# Patient Record
Sex: MALE | Race: ASIAN | NOT HISPANIC OR LATINO
[De-identification: names, ages, dates, MRNs, and addresses within clinical notes are randomized per-mention and may not be internally consistent; named-entity substitution may affect disease eponyms.]

---

## 2023-11-08 PROBLEM — Z00.00 ENCOUNTER FOR PREVENTIVE HEALTH EXAMINATION: Status: ACTIVE | Noted: 2023-11-08

## 2023-11-15 PROBLEM — R91.1 NODULE OF RIGHT LUNG: Status: ACTIVE | Noted: 2023-11-15

## 2023-11-16 ENCOUNTER — APPOINTMENT (OUTPATIENT)
Dept: THORACIC SURGERY | Facility: CLINIC | Age: 63
End: 2023-11-16
Payer: MEDICARE

## 2023-11-16 VITALS
TEMPERATURE: 97.9 F | BODY MASS INDEX: 23.92 KG/M2 | DIASTOLIC BLOOD PRESSURE: 68 MMHG | WEIGHT: 156 LBS | HEIGHT: 67.72 IN | OXYGEN SATURATION: 93 % | HEART RATE: 75 BPM | RESPIRATION RATE: 16 BRPM | SYSTOLIC BLOOD PRESSURE: 145 MMHG

## 2023-11-16 DIAGNOSIS — Z86.39 PERSONAL HISTORY OF OTHER ENDOCRINE, NUTRITIONAL AND METABOLIC DISEASE: ICD-10-CM

## 2023-11-16 DIAGNOSIS — R91.1 SOLITARY PULMONARY NODULE: ICD-10-CM

## 2023-11-16 DIAGNOSIS — Z56.0 UNEMPLOYMENT, UNSPECIFIED: ICD-10-CM

## 2023-11-16 DIAGNOSIS — Z87.891 PERSONAL HISTORY OF NICOTINE DEPENDENCE: ICD-10-CM

## 2023-11-16 DIAGNOSIS — Z78.9 OTHER SPECIFIED HEALTH STATUS: ICD-10-CM

## 2023-11-16 PROCEDURE — 99205 OFFICE O/P NEW HI 60 MIN: CPT

## 2023-11-16 SDOH — ECONOMIC STABILITY - INCOME SECURITY: UNEMPLOYMENT, UNSPECIFIED: Z56.0

## 2023-11-17 PROBLEM — Z78.9 ALCOHOL USE: Status: ACTIVE | Noted: 2023-11-17

## 2023-11-17 PROBLEM — Z56.0 UNEMPLOYED: Status: ACTIVE | Noted: 2023-11-17

## 2023-11-17 PROBLEM — Z86.39 HISTORY OF HYPERLIPIDEMIA: Status: RESOLVED | Noted: 2023-11-17 | Resolved: 2023-11-17

## 2023-11-17 PROBLEM — Z87.891 FORMER SMOKER: Status: ACTIVE | Noted: 2023-11-17

## 2023-11-17 PROBLEM — Z86.39 HISTORY OF TYPE 2 DIABETES MELLITUS: Status: RESOLVED | Noted: 2023-11-17 | Resolved: 2023-11-17

## 2023-11-17 RX ORDER — METFORMIN HYDROCHLORIDE 625 MG/1
TABLET ORAL
Refills: 0 | Status: ACTIVE | COMMUNITY

## 2023-11-17 RX ORDER — OMEPRAZOLE 40 MG/1
40 CAPSULE, DELAYED RELEASE ORAL
Refills: 0 | Status: ACTIVE | COMMUNITY

## 2023-11-17 RX ORDER — FOLIC ACID 1 MG/1
1 TABLET ORAL
Refills: 0 | Status: ACTIVE | COMMUNITY

## 2023-11-17 RX ORDER — CYANOCOBALAMIN (VITAMIN B-12) 100 MCG
100 TABLET ORAL
Refills: 0 | Status: ACTIVE | COMMUNITY

## 2023-11-17 RX ORDER — PRAVASTATIN SODIUM 20 MG/1
20 TABLET ORAL
Refills: 0 | Status: ACTIVE | COMMUNITY

## 2023-11-30 ENCOUNTER — OUTPATIENT (OUTPATIENT)
Dept: OUTPATIENT SERVICES | Facility: HOSPITAL | Age: 63
LOS: 1 days | End: 2023-11-30

## 2023-11-30 VITALS
WEIGHT: 149.91 LBS | DIASTOLIC BLOOD PRESSURE: 74 MMHG | OXYGEN SATURATION: 100 % | SYSTOLIC BLOOD PRESSURE: 115 MMHG | RESPIRATION RATE: 16 BRPM | HEIGHT: 67 IN | TEMPERATURE: 99 F | HEART RATE: 65 BPM

## 2023-11-30 DIAGNOSIS — R91.1 SOLITARY PULMONARY NODULE: ICD-10-CM

## 2023-11-30 DIAGNOSIS — E11.9 TYPE 2 DIABETES MELLITUS WITHOUT COMPLICATIONS: ICD-10-CM

## 2023-11-30 DIAGNOSIS — Z98.890 OTHER SPECIFIED POSTPROCEDURAL STATES: Chronic | ICD-10-CM

## 2023-11-30 LAB
BLD GP AB SCN SERPL QL: NEGATIVE — SIGNIFICANT CHANGE UP
BLD GP AB SCN SERPL QL: NEGATIVE — SIGNIFICANT CHANGE UP
RH IG SCN BLD-IMP: POSITIVE — SIGNIFICANT CHANGE UP

## 2023-11-30 RX ORDER — SODIUM CHLORIDE 9 MG/ML
1000 INJECTION, SOLUTION INTRAVENOUS
Refills: 0 | Status: DISCONTINUED | OUTPATIENT
Start: 2023-12-06 | End: 2023-12-07

## 2023-11-30 NOTE — H&P PST ADULT - OTHER CARE PROVIDERS
Dr Andersen [facility] [phone] Dr Andersen [facility] [phone] Dr Andersen [facility] [phone]

## 2023-11-30 NOTE — H&P PST ADULT - PRIMARY CARE PROVIDER
Dr Gus Scott cardiologist [phone] Dr Gus Scott cardiologist [phone] Dr Gus Scott cardiologist [phone]

## 2023-11-30 NOTE — H&P PST ADULT - ENDOCRINE COMMENTS
DM type 2 on metformin, A1C 6.6. h/o thyroid goiter as per PCP, normal TFT on recent labs, does not require medication

## 2023-11-30 NOTE — H&P PST ADULT - TEMPERATURE IN FAHRENHEIT (DEGREES F)
You have been given a medication that may cause drowsiness, do not drive or operate heavy machinery with this medication. Return to the Emergency department for any worsening or failure to improve, otherwise follow up with your primary care provider. Push Fluids.     98.6

## 2023-11-30 NOTE — H&P PST ADULT - HISTORY OF PRESENT ILLNESS
62 y/o male PMH HLD non obstructive CAD on recent cardiac cath presents to presurgical testing with diagnosis of solitary pulmonary nodule. Pt with right upper pulmonary nodule. Pt is scheduled for a right video assisted thoracoscopy robotic assisted wedge resection of right upper lobe possible right upper lobectomy mediastinal lymph node dissection.  64 y/o male PMH HLD non obstructive CAD on recent cardiac cath presents to presurgical testing with diagnosis of solitary pulmonary nodule. Pt with right upper pulmonary nodule. Pt is scheduled for a right video assisted thoracoscopy robotic assisted wedge resection of right upper lobe possible right upper lobectomy mediastinal lymph node dissection.

## 2023-11-30 NOTE — H&P PST ADULT - PROBLEM SELECTOR PLAN 1
Patient tentatively scheduled for right video assisted thoracoscopy robotic assisted wedge resection of right upper lobe possible right upper lobectomy mediastinal lymph node dissection for 12/6/23. Pre-op instructions provided via Mandarin . Pt given verbal and written instructions with teach back on chlorhexidine shampoo (mandarin hand out) and pepcid. Pt verbalized understanding with return demonstration.     Copy of medical and cardiac evaluation, cath report, ekg in chart, labs 11/29 CBC CMP A1C COAGS UA in chart.    T&S/ABO done

## 2023-11-30 NOTE — H&P PST ADULT - NSALCOHOLUSECOMMENT_GEN_ALL_CORE_FT
all other ROS negative except as per HPI
quit on 11/11/23 , used to drink daily, denies problems related to etoh use

## 2023-11-30 NOTE — H&P PST ADULT - NSICDXPASTMEDICALHX_GEN_ALL_CORE_FT
PAST MEDICAL HISTORY:  CAD (coronary artery disease)     DM2 (diabetes mellitus, type 2)     History of BPH     HLD (hyperlipidemia)     Joint pain     Solitary pulmonary nodule     Thyroid goiter     Tobacco use disorder

## 2023-12-05 NOTE — ASU PATIENT PROFILE, ADULT - FALL HARM RISK - UNIVERSAL INTERVENTIONS
Bed in lowest position, wheels locked, appropriate side rails in place/Call bell, personal items and telephone in reach/Instruct patient to call for assistance before getting out of bed or chair/Non-slip footwear when patient is out of bed/[place] to call system/Physically safe environment - no spills, clutter or unnecessary equipment/Purposeful Proactive Rounding/Room/bathroom lighting operational, light cord in reach Bed in lowest position, wheels locked, appropriate side rails in place/Call bell, personal items and telephone in reach/Instruct patient to call for assistance before getting out of bed or chair/Non-slip footwear when patient is out of bed/[place] to call system/Physically safe environment - no spills, clutter or unnecessary equipment/Purposeful Proactive Rounding/Room/bathroom lighting operational, light cord in reach

## 2023-12-06 ENCOUNTER — RESULT REVIEW (OUTPATIENT)
Age: 63
End: 2023-12-06

## 2023-12-06 ENCOUNTER — APPOINTMENT (OUTPATIENT)
Dept: THORACIC SURGERY | Facility: HOSPITAL | Age: 63
End: 2023-12-06

## 2023-12-06 ENCOUNTER — INPATIENT (INPATIENT)
Facility: HOSPITAL | Age: 63
LOS: 1 days | Discharge: HOME | End: 2023-12-08
Attending: THORACIC SURGERY (CARDIOTHORACIC VASCULAR SURGERY) | Admitting: THORACIC SURGERY (CARDIOTHORACIC VASCULAR SURGERY)
Payer: MEDICAID

## 2023-12-06 ENCOUNTER — TRANSCRIPTION ENCOUNTER (OUTPATIENT)
Age: 63
End: 2023-12-06

## 2023-12-06 VITALS
HEIGHT: 67 IN | TEMPERATURE: 97 F | SYSTOLIC BLOOD PRESSURE: 107 MMHG | DIASTOLIC BLOOD PRESSURE: 70 MMHG | OXYGEN SATURATION: 98 % | WEIGHT: 149.91 LBS | HEART RATE: 65 BPM | RESPIRATION RATE: 16 BRPM

## 2023-12-06 DIAGNOSIS — R91.1 SOLITARY PULMONARY NODULE: ICD-10-CM

## 2023-12-06 DIAGNOSIS — Z98.890 OTHER SPECIFIED POSTPROCEDURAL STATES: Chronic | ICD-10-CM

## 2023-12-06 LAB
GLUCOSE BLDC GLUCOMTR-MCNC: 115 MG/DL — HIGH (ref 70–99)
GLUCOSE BLDC GLUCOMTR-MCNC: 115 MG/DL — HIGH (ref 70–99)
GLUCOSE BLDC GLUCOMTR-MCNC: 119 MG/DL — HIGH (ref 70–99)
GLUCOSE BLDC GLUCOMTR-MCNC: 119 MG/DL — HIGH (ref 70–99)
GLUCOSE BLDC GLUCOMTR-MCNC: 129 MG/DL — HIGH (ref 70–99)
GLUCOSE BLDC GLUCOMTR-MCNC: 129 MG/DL — HIGH (ref 70–99)
GLUCOSE BLDC GLUCOMTR-MCNC: 147 MG/DL — HIGH (ref 70–99)
GLUCOSE BLDC GLUCOMTR-MCNC: 147 MG/DL — HIGH (ref 70–99)
GLUCOSE BLDC GLUCOMTR-MCNC: 165 MG/DL — HIGH (ref 70–99)
GLUCOSE BLDC GLUCOMTR-MCNC: 165 MG/DL — HIGH (ref 70–99)
GRAM STN FLD: SIGNIFICANT CHANGE UP
GRAM STN FLD: SIGNIFICANT CHANGE UP
SPECIMEN SOURCE: SIGNIFICANT CHANGE UP
SPECIMEN SOURCE: SIGNIFICANT CHANGE UP

## 2023-12-06 PROCEDURE — 71045 X-RAY EXAM CHEST 1 VIEW: CPT | Mod: 26

## 2023-12-06 PROCEDURE — 88312 SPECIAL STAINS GROUP 1: CPT | Mod: 26

## 2023-12-06 PROCEDURE — 32666 THORACOSCOPY W/WEDGE RESECT: CPT

## 2023-12-06 PROCEDURE — 32666 THORACOSCOPY W/WEDGE RESECT: CPT | Mod: 80

## 2023-12-06 PROCEDURE — 88313 SPECIAL STAINS GROUP 2: CPT | Mod: 26

## 2023-12-06 PROCEDURE — 88307 TISSUE EXAM BY PATHOLOGIST: CPT | Mod: 26

## 2023-12-06 PROCEDURE — S2900 ROBOTIC SURGICAL SYSTEM: CPT | Mod: NC

## 2023-12-06 DEVICE — STAPLER COVIDIEN TRI-STAPLE 60MM BLACK RELOAD: Type: IMPLANTABLE DEVICE | Site: RIGHT | Status: FUNCTIONAL

## 2023-12-06 DEVICE — CHEST DRAIN THORACIC ARGYLE PVC 28FR STRAIGHT: Type: IMPLANTABLE DEVICE | Site: RIGHT | Status: FUNCTIONAL

## 2023-12-06 RX ORDER — METFORMIN HYDROCHLORIDE 850 MG/1
1 TABLET ORAL
Refills: 0 | DISCHARGE

## 2023-12-06 RX ORDER — SODIUM CHLORIDE 9 MG/ML
1000 INJECTION, SOLUTION INTRAVENOUS
Refills: 0 | Status: DISCONTINUED | OUTPATIENT
Start: 2023-12-06 | End: 2023-12-08

## 2023-12-06 RX ORDER — ATORVASTATIN CALCIUM 80 MG/1
20 TABLET, FILM COATED ORAL AT BEDTIME
Refills: 0 | Status: DISCONTINUED | OUTPATIENT
Start: 2023-12-06 | End: 2023-12-08

## 2023-12-06 RX ORDER — ASPIRIN/CALCIUM CARB/MAGNESIUM 324 MG
81 TABLET ORAL DAILY
Refills: 0 | Status: DISCONTINUED | OUTPATIENT
Start: 2023-12-06 | End: 2023-12-08

## 2023-12-06 RX ORDER — HEPARIN SODIUM 5000 [USP'U]/ML
5000 INJECTION INTRAVENOUS; SUBCUTANEOUS EVERY 12 HOURS
Refills: 0 | Status: DISCONTINUED | OUTPATIENT
Start: 2023-12-06 | End: 2023-12-06

## 2023-12-06 RX ORDER — ASPIRIN/CALCIUM CARB/MAGNESIUM 324 MG
1 TABLET ORAL
Refills: 0 | DISCHARGE

## 2023-12-06 RX ORDER — DEXTROSE 50 % IN WATER 50 %
25 SYRINGE (ML) INTRAVENOUS ONCE
Refills: 0 | Status: DISCONTINUED | OUTPATIENT
Start: 2023-12-06 | End: 2023-12-08

## 2023-12-06 RX ORDER — FOLIC ACID 0.8 MG
1 TABLET ORAL DAILY
Refills: 0 | Status: DISCONTINUED | OUTPATIENT
Start: 2023-12-06 | End: 2023-12-08

## 2023-12-06 RX ORDER — PREGABALIN 225 MG/1
1000 CAPSULE ORAL DAILY
Refills: 0 | Status: DISCONTINUED | OUTPATIENT
Start: 2023-12-06 | End: 2023-12-08

## 2023-12-06 RX ORDER — POLYETHYLENE GLYCOL 3350 17 G/17G
17 POWDER, FOR SOLUTION ORAL DAILY
Refills: 0 | Status: DISCONTINUED | OUTPATIENT
Start: 2023-12-06 | End: 2023-12-08

## 2023-12-06 RX ORDER — DEXTROSE 50 % IN WATER 50 %
15 SYRINGE (ML) INTRAVENOUS ONCE
Refills: 0 | Status: DISCONTINUED | OUTPATIENT
Start: 2023-12-06 | End: 2023-12-08

## 2023-12-06 RX ORDER — INSULIN LISPRO 100/ML
VIAL (ML) SUBCUTANEOUS AT BEDTIME
Refills: 0 | Status: DISCONTINUED | OUTPATIENT
Start: 2023-12-06 | End: 2023-12-08

## 2023-12-06 RX ORDER — HYDROMORPHONE HYDROCHLORIDE 2 MG/ML
0.5 INJECTION INTRAMUSCULAR; INTRAVENOUS; SUBCUTANEOUS
Refills: 0 | Status: DISCONTINUED | OUTPATIENT
Start: 2023-12-06 | End: 2023-12-07

## 2023-12-06 RX ORDER — GABAPENTIN 400 MG/1
300 CAPSULE ORAL ONCE
Refills: 0 | Status: COMPLETED | OUTPATIENT
Start: 2023-12-06 | End: 2023-12-06

## 2023-12-06 RX ORDER — ACETAMINOPHEN 500 MG
975 TABLET ORAL ONCE
Refills: 0 | Status: COMPLETED | OUTPATIENT
Start: 2023-12-06 | End: 2023-12-06

## 2023-12-06 RX ORDER — INSULIN LISPRO 100/ML
VIAL (ML) SUBCUTANEOUS
Refills: 0 | Status: DISCONTINUED | OUTPATIENT
Start: 2023-12-06 | End: 2023-12-08

## 2023-12-06 RX ORDER — GLUCAGON INJECTION, SOLUTION 0.5 MG/.1ML
1 INJECTION, SOLUTION SUBCUTANEOUS ONCE
Refills: 0 | Status: DISCONTINUED | OUTPATIENT
Start: 2023-12-06 | End: 2023-12-08

## 2023-12-06 RX ORDER — HEPARIN SODIUM 5000 [USP'U]/ML
5000 INJECTION INTRAVENOUS; SUBCUTANEOUS EVERY 8 HOURS
Refills: 0 | Status: DISCONTINUED | OUTPATIENT
Start: 2023-12-06 | End: 2023-12-08

## 2023-12-06 RX ORDER — NALOXONE HYDROCHLORIDE 4 MG/.1ML
0.1 SPRAY NASAL
Refills: 0 | Status: DISCONTINUED | OUTPATIENT
Start: 2023-12-06 | End: 2023-12-08

## 2023-12-06 RX ORDER — SENNA PLUS 8.6 MG/1
2 TABLET ORAL AT BEDTIME
Refills: 0 | Status: DISCONTINUED | OUTPATIENT
Start: 2023-12-06 | End: 2023-12-08

## 2023-12-06 RX ORDER — DEXTROSE 50 % IN WATER 50 %
12.5 SYRINGE (ML) INTRAVENOUS ONCE
Refills: 0 | Status: DISCONTINUED | OUTPATIENT
Start: 2023-12-06 | End: 2023-12-08

## 2023-12-06 RX ORDER — DIPHENHYDRAMINE HCL 50 MG
50 CAPSULE ORAL EVERY 4 HOURS
Refills: 0 | Status: DISCONTINUED | OUTPATIENT
Start: 2023-12-06 | End: 2023-12-07

## 2023-12-06 RX ORDER — ONDANSETRON 8 MG/1
4 TABLET, FILM COATED ORAL EVERY 6 HOURS
Refills: 0 | Status: DISCONTINUED | OUTPATIENT
Start: 2023-12-06 | End: 2023-12-08

## 2023-12-06 RX ORDER — FOLIC ACID 0.8 MG
1 TABLET ORAL
Refills: 0 | DISCHARGE

## 2023-12-06 RX ORDER — HYDROMORPHONE HYDROCHLORIDE 2 MG/ML
30 INJECTION INTRAMUSCULAR; INTRAVENOUS; SUBCUTANEOUS
Refills: 0 | Status: DISCONTINUED | OUTPATIENT
Start: 2023-12-06 | End: 2023-12-07

## 2023-12-06 RX ADMIN — ATORVASTATIN CALCIUM 20 MILLIGRAM(S): 80 TABLET, FILM COATED ORAL at 22:32

## 2023-12-06 RX ADMIN — HYDROMORPHONE HYDROCHLORIDE 30 MILLILITER(S): 2 INJECTION INTRAMUSCULAR; INTRAVENOUS; SUBCUTANEOUS at 20:50

## 2023-12-06 RX ADMIN — HEPARIN SODIUM 5000 UNIT(S): 5000 INJECTION INTRAVENOUS; SUBCUTANEOUS at 09:38

## 2023-12-06 RX ADMIN — GABAPENTIN 300 MILLIGRAM(S): 400 CAPSULE ORAL at 09:38

## 2023-12-06 RX ADMIN — HYDROMORPHONE HYDROCHLORIDE 30 MILLILITER(S): 2 INJECTION INTRAMUSCULAR; INTRAVENOUS; SUBCUTANEOUS at 20:17

## 2023-12-06 RX ADMIN — SENNA PLUS 2 TABLET(S): 8.6 TABLET ORAL at 22:32

## 2023-12-06 RX ADMIN — HYDROMORPHONE HYDROCHLORIDE 30 MILLILITER(S): 2 INJECTION INTRAMUSCULAR; INTRAVENOUS; SUBCUTANEOUS at 13:12

## 2023-12-06 RX ADMIN — HEPARIN SODIUM 5000 UNIT(S): 5000 INJECTION INTRAVENOUS; SUBCUTANEOUS at 22:32

## 2023-12-06 RX ADMIN — Medication 975 MILLIGRAM(S): at 09:37

## 2023-12-06 NOTE — ASU PREOP CHECKLIST - 4.
partial upper and lower dentures, and lower dental implants, left upper tooth loose partial upper dentures, and lower dental implants, left upper tooth loose as per patient - anesthesia aware and assessed

## 2023-12-06 NOTE — ASU PREOP CHECKLIST - ANTIBIOTIC
took old antibiotic for ear inflammation/no (get order) took old antibiotic for ear inflammation yesterday, does not know the name of it/no (get order)

## 2023-12-06 NOTE — BRIEF OPERATIVE NOTE - NSICDXBRIEFPROCEDURE_GEN_ALL_CORE_FT
PROCEDURES:  Flexible bronchoscopy 06-Dec-2023 12:50:22  Afia Cavazos  Wedge resection, lung, robot-assisted, using VATS 06-Dec-2023 12:50:33  Afia Cavazos

## 2023-12-06 NOTE — ASU PREOP CHECKLIST - PATIENT'S PERSONAL PROPERTY REMOVED
upper and lower partial/dentures upper and lower partial, pacu 6/dentures upper partial, pacu 6/dentures

## 2023-12-06 NOTE — ASU PREOP CHECKLIST - NS PREOP CHK MONITOR ANESTHESIA CONSENT
regression or worsening at which point xrays will be obtained. I affirm this is a Patient Initiated Episode with a Patient who has not had a related appointment within my department in the past 7 days or scheduled within the next 24 hours.     Patient identification was verified at the start of the visit: Yes    Total Time: minutes: 5-10 minutes    Note: not billable if this call serves to triage the patient into an appointment for the relevant concern      JULES Pitt done

## 2023-12-06 NOTE — PATIENT PROFILE ADULT - FALL HARM RISK - UNIVERSAL INTERVENTIONS
Bed in lowest position, wheels locked, appropriate side rails in place/Call bell, personal items and telephone in reach/Instruct patient to call for assistance before getting out of bed or chair/Non-slip footwear when patient is out of bed/[place] to call system/Physically safe environment - no spills, clutter or unnecessary equipment/Purposeful Proactive Rounding/Room/bathroom lighting operational, light cord in reach Bed in lowest position, wheels locked, appropriate side rails in place/Call bell, personal items and telephone in reach/Instruct patient to call for assistance before getting out of bed or chair/Non-slip footwear when patient is out of bed/[place] to call system/Physically safe environment - no spills, clutter or unnecessary equipment/Purposeful Proactive Rounding/Room/bathroom lighting operational, light cord in reach

## 2023-12-06 NOTE — CHART NOTE - NSCHARTNOTEFT_GEN_A_CORE
Patient s/p Flex Bronch, Right Vats, robotic assisted RUL wedge resection.  Patient resting, IV PCA in place.  Chest tube to suction, no air leak noted.  Incision with dressing clean and dry.  Patient tolerating po, voiding.    Vital Signs Last 24 Hrs  T(C): 36.4 (06 Dec 2023 20:00), Max: 36.6 (06 Dec 2023 15:00)  T(F): 97.5 (06 Dec 2023 20:00), Max: 97.9 (06 Dec 2023 15:00)  HR: 82 (06 Dec 2023 20:00) (60 - 82)  BP: 114/75 (06 Dec 2023 20:00) (107/70 - 143/93)  BP(mean): 88 (06 Dec 2023 20:00) (86 - 108)  RR: 17 (06 Dec 2023 20:00) (12 - 19)  SpO2: 97% (06 Dec 2023 20:00) (95% - 100%)    Parameters below as of 06 Dec 2023 18:00  Patient On (Oxygen Delivery Method): nasal cannula  O2 Flow (L/min): 2    I&O's Detail    06 Dec 2023 07:01  -  06 Dec 2023 22:03  --------------------------------------------------------  IN:    Lactated Ringers: 210 mL    Oral Fluid: 630 mL  Total IN: 840 mL    OUT:    Chest Tube (mL): 75 mL    Voided (mL): 300 mL  Total OUT: 375 mL    Total NET: 465 mL      MEDICATIONS  (STANDING):  aspirin enteric coated 81 milliGRAM(s) Oral daily  atorvastatin 20 milliGRAM(s) Oral at bedtime  cyanocobalamin 1000 MICROGram(s) Oral daily  dextrose 5%. 1000 milliLiter(s) (50 mL/Hr) IV Continuous <Continuous>  dextrose 5%. 1000 milliLiter(s) (100 mL/Hr) IV Continuous <Continuous>  dextrose 50% Injectable 12.5 Gram(s) IV Push once  dextrose 50% Injectable 25 Gram(s) IV Push once  dextrose 50% Injectable 25 Gram(s) IV Push once  folic acid 1 milliGRAM(s) Oral daily  glucagon  Injectable 1 milliGRAM(s) IntraMuscular once  heparin   Injectable 5000 Unit(s) SubCutaneous every 8 hours  HYDROmorphone PCA (1 mG/mL) 30 milliLiter(s) PCA Continuous PCA Continuous  insulin lispro (ADMELOG) corrective regimen sliding scale   SubCutaneous at bedtime  insulin lispro (ADMELOG) corrective regimen sliding scale   SubCutaneous three times a day before meals  lactated ringers. 1000 milliLiter(s) (30 mL/Hr) IV Continuous <Continuous>  multivitamin 1 Tablet(s) Oral daily  polyethylene glycol 3350 17 Gram(s) Oral daily  senna 2 Tablet(s) Oral at bedtime    A/P: S/P Flex Bronch, Right Vats, robotic assisted  RUL wedge resection   Continue chest tube to suction   Follow up labs and CXR in am   Continue IV PCA for pain management   Chest PT, ambulation and incentive spirometer

## 2023-12-07 LAB
ANION GAP SERPL CALC-SCNC: 12 MMOL/L — SIGNIFICANT CHANGE UP (ref 7–14)
ANION GAP SERPL CALC-SCNC: 12 MMOL/L — SIGNIFICANT CHANGE UP (ref 7–14)
BASOPHILS # BLD AUTO: 0.03 K/UL — SIGNIFICANT CHANGE UP (ref 0–0.2)
BASOPHILS # BLD AUTO: 0.03 K/UL — SIGNIFICANT CHANGE UP (ref 0–0.2)
BASOPHILS NFR BLD AUTO: 0.6 % — SIGNIFICANT CHANGE UP (ref 0–2)
BASOPHILS NFR BLD AUTO: 0.6 % — SIGNIFICANT CHANGE UP (ref 0–2)
BUN SERPL-MCNC: 15 MG/DL — SIGNIFICANT CHANGE UP (ref 7–23)
BUN SERPL-MCNC: 15 MG/DL — SIGNIFICANT CHANGE UP (ref 7–23)
CALCIUM SERPL-MCNC: 8.8 MG/DL — SIGNIFICANT CHANGE UP (ref 8.4–10.5)
CALCIUM SERPL-MCNC: 8.8 MG/DL — SIGNIFICANT CHANGE UP (ref 8.4–10.5)
CHLORIDE SERPL-SCNC: 101 MMOL/L — SIGNIFICANT CHANGE UP (ref 98–107)
CHLORIDE SERPL-SCNC: 101 MMOL/L — SIGNIFICANT CHANGE UP (ref 98–107)
CO2 SERPL-SCNC: 25 MMOL/L — SIGNIFICANT CHANGE UP (ref 22–31)
CO2 SERPL-SCNC: 25 MMOL/L — SIGNIFICANT CHANGE UP (ref 22–31)
CREAT SERPL-MCNC: 0.79 MG/DL — SIGNIFICANT CHANGE UP (ref 0.5–1.3)
CREAT SERPL-MCNC: 0.79 MG/DL — SIGNIFICANT CHANGE UP (ref 0.5–1.3)
EGFR: 99 ML/MIN/1.73M2 — SIGNIFICANT CHANGE UP
EGFR: 99 ML/MIN/1.73M2 — SIGNIFICANT CHANGE UP
EOSINOPHIL # BLD AUTO: 0.01 K/UL — SIGNIFICANT CHANGE UP (ref 0–0.5)
EOSINOPHIL # BLD AUTO: 0.01 K/UL — SIGNIFICANT CHANGE UP (ref 0–0.5)
EOSINOPHIL NFR BLD AUTO: 0.2 % — SIGNIFICANT CHANGE UP (ref 0–6)
EOSINOPHIL NFR BLD AUTO: 0.2 % — SIGNIFICANT CHANGE UP (ref 0–6)
GLUCOSE BLDC GLUCOMTR-MCNC: 131 MG/DL — HIGH (ref 70–99)
GLUCOSE BLDC GLUCOMTR-MCNC: 131 MG/DL — HIGH (ref 70–99)
GLUCOSE BLDC GLUCOMTR-MCNC: 136 MG/DL — HIGH (ref 70–99)
GLUCOSE BLDC GLUCOMTR-MCNC: 136 MG/DL — HIGH (ref 70–99)
GLUCOSE BLDC GLUCOMTR-MCNC: 159 MG/DL — HIGH (ref 70–99)
GLUCOSE BLDC GLUCOMTR-MCNC: 159 MG/DL — HIGH (ref 70–99)
GLUCOSE BLDC GLUCOMTR-MCNC: 171 MG/DL — HIGH (ref 70–99)
GLUCOSE BLDC GLUCOMTR-MCNC: 171 MG/DL — HIGH (ref 70–99)
GLUCOSE SERPL-MCNC: 135 MG/DL — HIGH (ref 70–99)
GLUCOSE SERPL-MCNC: 135 MG/DL — HIGH (ref 70–99)
HCT VFR BLD CALC: 38.2 % — LOW (ref 39–50)
HCT VFR BLD CALC: 38.2 % — LOW (ref 39–50)
HGB BLD-MCNC: 12.8 G/DL — LOW (ref 13–17)
HGB BLD-MCNC: 12.8 G/DL — LOW (ref 13–17)
IANC: 3.68 K/UL — SIGNIFICANT CHANGE UP (ref 1.8–7.4)
IANC: 3.68 K/UL — SIGNIFICANT CHANGE UP (ref 1.8–7.4)
IMM GRANULOCYTES NFR BLD AUTO: 0.2 % — SIGNIFICANT CHANGE UP (ref 0–0.9)
IMM GRANULOCYTES NFR BLD AUTO: 0.2 % — SIGNIFICANT CHANGE UP (ref 0–0.9)
LYMPHOCYTES # BLD AUTO: 0.85 K/UL — LOW (ref 1–3.3)
LYMPHOCYTES # BLD AUTO: 0.85 K/UL — LOW (ref 1–3.3)
LYMPHOCYTES # BLD AUTO: 16.8 % — SIGNIFICANT CHANGE UP (ref 13–44)
LYMPHOCYTES # BLD AUTO: 16.8 % — SIGNIFICANT CHANGE UP (ref 13–44)
MCHC RBC-ENTMCNC: 32.5 PG — SIGNIFICANT CHANGE UP (ref 27–34)
MCHC RBC-ENTMCNC: 32.5 PG — SIGNIFICANT CHANGE UP (ref 27–34)
MCHC RBC-ENTMCNC: 33.5 GM/DL — SIGNIFICANT CHANGE UP (ref 32–36)
MCHC RBC-ENTMCNC: 33.5 GM/DL — SIGNIFICANT CHANGE UP (ref 32–36)
MCV RBC AUTO: 97 FL — SIGNIFICANT CHANGE UP (ref 80–100)
MCV RBC AUTO: 97 FL — SIGNIFICANT CHANGE UP (ref 80–100)
MONOCYTES # BLD AUTO: 0.48 K/UL — SIGNIFICANT CHANGE UP (ref 0–0.9)
MONOCYTES # BLD AUTO: 0.48 K/UL — SIGNIFICANT CHANGE UP (ref 0–0.9)
MONOCYTES NFR BLD AUTO: 9.5 % — SIGNIFICANT CHANGE UP (ref 2–14)
MONOCYTES NFR BLD AUTO: 9.5 % — SIGNIFICANT CHANGE UP (ref 2–14)
NEUTROPHILS # BLD AUTO: 3.68 K/UL — SIGNIFICANT CHANGE UP (ref 1.8–7.4)
NEUTROPHILS # BLD AUTO: 3.68 K/UL — SIGNIFICANT CHANGE UP (ref 1.8–7.4)
NEUTROPHILS NFR BLD AUTO: 72.7 % — SIGNIFICANT CHANGE UP (ref 43–77)
NEUTROPHILS NFR BLD AUTO: 72.7 % — SIGNIFICANT CHANGE UP (ref 43–77)
NIGHT BLUE STAIN TISS: SIGNIFICANT CHANGE UP
NIGHT BLUE STAIN TISS: SIGNIFICANT CHANGE UP
NRBC # BLD: 0 /100 WBCS — SIGNIFICANT CHANGE UP (ref 0–0)
NRBC # BLD: 0 /100 WBCS — SIGNIFICANT CHANGE UP (ref 0–0)
NRBC # FLD: 0 K/UL — SIGNIFICANT CHANGE UP (ref 0–0)
NRBC # FLD: 0 K/UL — SIGNIFICANT CHANGE UP (ref 0–0)
PLATELET # BLD AUTO: 169 K/UL — SIGNIFICANT CHANGE UP (ref 150–400)
PLATELET # BLD AUTO: 169 K/UL — SIGNIFICANT CHANGE UP (ref 150–400)
POTASSIUM SERPL-MCNC: 3.9 MMOL/L — SIGNIFICANT CHANGE UP (ref 3.5–5.3)
POTASSIUM SERPL-MCNC: 3.9 MMOL/L — SIGNIFICANT CHANGE UP (ref 3.5–5.3)
POTASSIUM SERPL-SCNC: 3.9 MMOL/L — SIGNIFICANT CHANGE UP (ref 3.5–5.3)
POTASSIUM SERPL-SCNC: 3.9 MMOL/L — SIGNIFICANT CHANGE UP (ref 3.5–5.3)
RBC # BLD: 3.94 M/UL — LOW (ref 4.2–5.8)
RBC # BLD: 3.94 M/UL — LOW (ref 4.2–5.8)
RBC # FLD: 12.3 % — SIGNIFICANT CHANGE UP (ref 10.3–14.5)
RBC # FLD: 12.3 % — SIGNIFICANT CHANGE UP (ref 10.3–14.5)
SODIUM SERPL-SCNC: 138 MMOL/L — SIGNIFICANT CHANGE UP (ref 135–145)
SODIUM SERPL-SCNC: 138 MMOL/L — SIGNIFICANT CHANGE UP (ref 135–145)
SPECIMEN SOURCE: SIGNIFICANT CHANGE UP
SPECIMEN SOURCE: SIGNIFICANT CHANGE UP
WBC # BLD: 5.06 K/UL — SIGNIFICANT CHANGE UP (ref 3.8–10.5)
WBC # BLD: 5.06 K/UL — SIGNIFICANT CHANGE UP (ref 3.8–10.5)
WBC # FLD AUTO: 5.06 K/UL — SIGNIFICANT CHANGE UP (ref 3.8–10.5)
WBC # FLD AUTO: 5.06 K/UL — SIGNIFICANT CHANGE UP (ref 3.8–10.5)

## 2023-12-07 PROCEDURE — 71045 X-RAY EXAM CHEST 1 VIEW: CPT | Mod: 26,76

## 2023-12-07 RX ORDER — POLYETHYLENE GLYCOL 3350 17 G/17G
17 POWDER, FOR SOLUTION ORAL
Qty: 0 | Refills: 0 | DISCHARGE
Start: 2023-12-07

## 2023-12-07 RX ORDER — OXYCODONE HYDROCHLORIDE 5 MG/1
5 TABLET ORAL
Refills: 0 | Status: DISCONTINUED | OUTPATIENT
Start: 2023-12-07 | End: 2023-12-08

## 2023-12-07 RX ORDER — HYDROMORPHONE HYDROCHLORIDE 2 MG/ML
0.2 INJECTION INTRAMUSCULAR; INTRAVENOUS; SUBCUTANEOUS
Refills: 0 | Status: DISCONTINUED | OUTPATIENT
Start: 2023-12-07 | End: 2023-12-08

## 2023-12-07 RX ORDER — SENNA PLUS 8.6 MG/1
2 TABLET ORAL
Qty: 0 | Refills: 0 | DISCHARGE
Start: 2023-12-07

## 2023-12-07 RX ORDER — OXYCODONE HYDROCHLORIDE 5 MG/1
2.5 TABLET ORAL
Refills: 0 | Status: DISCONTINUED | OUTPATIENT
Start: 2023-12-07 | End: 2023-12-08

## 2023-12-07 RX ORDER — ACETAMINOPHEN 500 MG
650 TABLET ORAL EVERY 6 HOURS
Refills: 0 | Status: DISCONTINUED | OUTPATIENT
Start: 2023-12-07 | End: 2023-12-08

## 2023-12-07 RX ADMIN — Medication 650 MILLIGRAM(S): at 12:34

## 2023-12-07 RX ADMIN — PREGABALIN 1000 MICROGRAM(S): 225 CAPSULE ORAL at 11:33

## 2023-12-07 RX ADMIN — SENNA PLUS 2 TABLET(S): 8.6 TABLET ORAL at 22:55

## 2023-12-07 RX ADMIN — Medication 1 TABLET(S): at 11:34

## 2023-12-07 RX ADMIN — Medication 1 MILLIGRAM(S): at 11:33

## 2023-12-07 RX ADMIN — Medication 1: at 12:14

## 2023-12-07 RX ADMIN — HYDROMORPHONE HYDROCHLORIDE 30 MILLILITER(S): 2 INJECTION INTRAMUSCULAR; INTRAVENOUS; SUBCUTANEOUS at 07:21

## 2023-12-07 RX ADMIN — Medication 650 MILLIGRAM(S): at 11:34

## 2023-12-07 RX ADMIN — ATORVASTATIN CALCIUM 20 MILLIGRAM(S): 80 TABLET, FILM COATED ORAL at 22:55

## 2023-12-07 RX ADMIN — Medication 81 MILLIGRAM(S): at 11:35

## 2023-12-07 RX ADMIN — HEPARIN SODIUM 5000 UNIT(S): 5000 INJECTION INTRAVENOUS; SUBCUTANEOUS at 22:55

## 2023-12-07 RX ADMIN — Medication 1: at 17:47

## 2023-12-07 RX ADMIN — Medication 650 MILLIGRAM(S): at 17:19

## 2023-12-07 RX ADMIN — HEPARIN SODIUM 5000 UNIT(S): 5000 INJECTION INTRAVENOUS; SUBCUTANEOUS at 06:17

## 2023-12-07 RX ADMIN — POLYETHYLENE GLYCOL 3350 17 GRAM(S): 17 POWDER, FOR SOLUTION ORAL at 11:35

## 2023-12-07 RX ADMIN — HEPARIN SODIUM 5000 UNIT(S): 5000 INJECTION INTRAVENOUS; SUBCUTANEOUS at 14:00

## 2023-12-07 RX ADMIN — Medication 650 MILLIGRAM(S): at 22:55

## 2023-12-07 NOTE — PROGRESS NOTE ADULT - SUBJECTIVE AND OBJECTIVE BOX
ANESTHESIA POSTOP CHECK    63y Male POSTOP DAY 1 S/P   [ x] General Anesthesia  [ ] Carlos Anesthesia  [ ] MAC    Vital Signs Last 24 Hrs  T(C): 36.7 (07 Dec 2023 04:35), Max: 37.1 (07 Dec 2023 00:10)  T(F): 98.1 (07 Dec 2023 04:35), Max: 98.7 (07 Dec 2023 00:10)  HR: 75 (07 Dec 2023 04:35) (60 - 82)  BP: 110/64 (07 Dec 2023 04:35) (104/60 - 143/93)  BP(mean): 88 (06 Dec 2023 20:00) (86 - 108)  RR: 18 (07 Dec 2023 04:35) (12 - 19)  SpO2: 99% (07 Dec 2023 04:35) (95% - 100%)    Parameters below as of 07 Dec 2023 04:35  Patient On (Oxygen Delivery Method): room air      I&O's Summary    06 Dec 2023 07:01  -  07 Dec 2023 07:00  --------------------------------------------------------  IN: 1170 mL / OUT: 720 mL / NET: 450 mL        [x ] NO APPARENT ANESTHESIA COMPLICATIONS      Comments:

## 2023-12-07 NOTE — DISCHARGE NOTE PROVIDER - CARE PROVIDERS DIRECT ADDRESSES
[email or web address]

## 2023-12-07 NOTE — PROGRESS NOTE ADULT - SUBJECTIVE AND OBJECTIVE BOX
Subjective: patient seen and examined with thoracic surgery team and DR Morillo  pt without acute complaints  pain controlled  pt denies chest pain or shortness of breath  using incentive spirometer  on bowel regimen, +flatus, +BM  ambulatory with assistance  tube removed today  fever Tmax 102.7  d/w attending on morning TEAMS report      Vital Signs:  Vital Signs Last 24 Hrs  T(C): 37.9 (12-07-23 @ 14:19), Max: 39.2 (12-07-23 @ 12:27)  T(F): 100.2 (12-07-23 @ 14:19), Max: 102.6 (12-07-23 @ 12:27)  HR: 89 (12-07-23 @ 12:27) (62 - 89)  BP: 110/62 (12-07-23 @ 12:27) (103/67 - 123/74)  RR: 18 (12-07-23 @ 12:27) (13 - 19)  SpO2: 96% (12-07-23 @ 12:27) (95% - 100%) on (O2)    PE  Telemetry/Alarms: SR  General: awake and alert NAD  Neurology: A&Ox3, nonfocal, WILKERSON x 4  Respiratory: CTA B/L  CV: RRR, S1S2, no murmurs, rubs or gallops  Abdominal: Soft, NT, ND +BS, Last BM  Extremities: No edema, + peripheral pulses  Incisions: c,d,i  Tubes: tube removed; f/u CXR reviewed  Relevant labs, radiology and Medications reviewed                        12.8   5.06  )-----------( 169      ( 07 Dec 2023 05:45 )             38.2     12-07    138  |  101  |  15  ----------------------------<  135<H>  3.9   |  25  |  0.79    Ca    8.8      07 Dec 2023 05:45        MEDICATIONS  (STANDING):  acetaminophen     Tablet .. 650 milliGRAM(s) Oral every 6 hours  aspirin enteric coated 81 milliGRAM(s) Oral daily  atorvastatin 20 milliGRAM(s) Oral at bedtime  cyanocobalamin 1000 MICROGram(s) Oral daily  dextrose 5%. 1000 milliLiter(s) (50 mL/Hr) IV Continuous <Continuous>  dextrose 5%. 1000 milliLiter(s) (100 mL/Hr) IV Continuous <Continuous>  dextrose 50% Injectable 25 Gram(s) IV Push once  dextrose 50% Injectable 12.5 Gram(s) IV Push once  dextrose 50% Injectable 25 Gram(s) IV Push once  folic acid 1 milliGRAM(s) Oral daily  glucagon  Injectable 1 milliGRAM(s) IntraMuscular once  heparin   Injectable 5000 Unit(s) SubCutaneous every 8 hours  insulin lispro (ADMELOG) corrective regimen sliding scale   SubCutaneous at bedtime  insulin lispro (ADMELOG) corrective regimen sliding scale   SubCutaneous three times a day before meals  multivitamin 1 Tablet(s) Oral daily  polyethylene glycol 3350 17 Gram(s) Oral daily  senna 2 Tablet(s) Oral at bedtime    MEDICATIONS  (PRN):  dextrose Oral Gel 15 Gram(s) Oral once PRN Blood Glucose LESS THAN 70 milliGRAM(s)/deciliter  HYDROmorphone  Injectable 0.2 milliGRAM(s) IV Push every 3 hours PRN Severe breakthrough  Pain (7 - 10)  naloxone Injectable 0.1 milliGRAM(s) IV Push every 3 minutes PRN For ANY of the following changes in patient status:  A. RR LESS THAN 10 breaths per minute, B. Oxygen saturation LESS THAN 90%, C. Sedation score of 6  ondansetron Injectable 4 milliGRAM(s) IV Push every 6 hours PRN Nausea  oxyCODONE    IR 2.5 milliGRAM(s) Oral every 3 hours PRN Moderate Pain (4 - 6)  oxyCODONE    IR 5 milliGRAM(s) Oral every 3 hours PRN Severe Pain (7 - 10)    Pertinent Physical Exam  I&O's Summary    06 Dec 2023 07:01  -  07 Dec 2023 07:00  --------------------------------------------------------  IN: 1170 mL / OUT: 720 mL / NET: 450 mL        PAST MEDICAL & SURGICAL HISTORY:  HLD (hyperlipidemia)      CAD (coronary artery disease)      DM2 (diabetes mellitus, type 2)      History of BPH      Thyroid goiter      Solitary pulmonary nodule      Tobacco use disorder      Joint pain      History of cardiac cath      ASSESSMENT  63y old  Male who presents with a chief complaint of solitary pulmonary nodule  (07 Dec 2023 00:17)  S/P RVATS RUL wedge resection 12/6/2023. Postop, tube removed. Pt febrile to 102.7, WBC 5  d/w Dr Morillo will keep in hospital for observation    PLAN  Neuro: Pain management  Pulm: Encourage coughing, deep breathing and use of incentive spirometry. Wean off supplemental oxygen as able. Daily CXR.   Cardio: Monitor telemetry/alarms  GI: Tolerating diet. Continue stool softeners.  Renal: monitor urine output, supplement electrolytes as needed  Vasc: Heparin SC/SCDs for DVT prophylaxis  Heme: Stable H/H. .   ID: Off antibiotics. Stable.  Therapy: OOB/ambulate  Disposition: Pt febrile to 102.7, WBC 5  d/w Dr Morillo will keep in hospital for observation  Discussed with Cardiothoracic Team at AM rounds.

## 2023-12-07 NOTE — DISCHARGE NOTE PROVIDER - NSDCMRMEDTOKEN_GEN_ALL_CORE_FT
aspirin 81 mg oral delayed release tablet: 1 tab(s) orally once a day (in the morning)  folic acid 1 mg oral tablet: 1 tab(s) orally once a day (in the morning)  metFORMIN 850 mg oral tablet: 1 tab(s) orally 2 times a day  Multiple Vitamins oral capsule: 1 cap(s) orally once a day (in the morning)  pravastatin 20 mg oral tablet: 1 tab(s) orally once a day (in the morning)  vit b12 1000 mcg every morning:    aspirin 81 mg oral delayed release tablet: 1 tab(s) orally once a day (in the morning)  folic acid 1 mg oral tablet: 1 tab(s) orally once a day (in the morning)  metFORMIN 850 mg oral tablet: 1 tab(s) orally 2 times a day  Multiple Vitamins oral capsule: 1 cap(s) orally once a day (in the morning)  polyethylene glycol 3350 oral powder for reconstitution: 17 gram(s) orally once a day  pravastatin 20 mg oral tablet: 1 tab(s) orally once a day (in the morning)  senna leaf extract oral tablet: 2 tab(s) orally once a day (at bedtime)  vit b12 1000 mcg every morning:    acetaminophen 325 mg oral tablet: 2 tab(s) orally every 6 hours  aspirin 81 mg oral delayed release tablet: 1 tab(s) orally once a day (in the morning)  folic acid 1 mg oral tablet: 1 tab(s) orally once a day (in the morning)  metFORMIN 850 mg oral tablet: 1 tab(s) orally 2 times a day  Multiple Vitamins oral capsule: 1 cap(s) orally once a day (in the morning)  oxyCODONE 5 mg oral tablet: 1 tab(s) orally every 4 hours as needed for Severe Pain (7 - 10) MDD: 6  polyethylene glycol 3350 oral powder for reconstitution: 17 gram(s) orally once a day  pravastatin 20 mg oral tablet: 1 tab(s) orally once a day (in the morning)  senna leaf extract oral tablet: 2 tab(s) orally once a day (at bedtime)  vit b12 1000 mcg every morning:

## 2023-12-07 NOTE — DISCHARGE NOTE PROVIDER - NSDCCPTREATMENT_GEN_ALL_CORE_FT
PRINCIPAL PROCEDURE  Procedure: Wedge resection, lung, robot-assisted, using VATS  Findings and Treatment:

## 2023-12-07 NOTE — PROGRESS NOTE ADULT - SUBJECTIVE AND OBJECTIVE BOX
POST ANESTHESIA EVALUATION    63y Male POSTOP DAY 1   MENTAL STATUS: Patient participation [x  ] Awake     [  ] Arousable     [  ] Sedated    AIRWAY PATENCY: [ x ] Satisfactory  [  ] Other:     Vital Signs Last 24 Hrs  T(C): 39.2 (07 Dec 2023 12:27), Max: 39.2 (07 Dec 2023 12:27)  T(F): 102.6 (07 Dec 2023 12:27), Max: 102.6 (07 Dec 2023 12:27)  HR: 89 (07 Dec 2023 12:27) (60 - 89)  BP: 110/62 (07 Dec 2023 12:27) (103/67 - 143/93)  BP(mean): 88 (06 Dec 2023 20:00) (86 - 108)  RR: 18 (07 Dec 2023 12:27) (12 - 19)  SpO2: 96% (07 Dec 2023 12:27) (95% - 100%)    Parameters below as of 07 Dec 2023 12:27  Patient On (Oxygen Delivery Method): room air      I&O's Summary    06 Dec 2023 07:01  -  07 Dec 2023 07:00  --------------------------------------------------------  IN: 1170 mL / OUT: 720 mL / NET: 450 mL          HYDRATION STATUS:  [ x ] SATISFACTORY   [  ] OTHER    NAUSEA/ VOMITTING:  [ x ] NONE  [  ] CONTROLLED [  ] OTHER     PAIN: [ x ] CONTROLLED WITH CURRENT REGIMEN  [  ] OTHER    [ x ] NO APPARENT ANESTHESIA COMPLICATIONS

## 2023-12-07 NOTE — DISCHARGE NOTE PROVIDER - CARE PROVIDER_API CALL
Hira Morillo  Thoracic Surgery  [address] [place] [address], [address] [place] [address]  Phone: [phone]  Fax: [phone]  Follow Up Time:    Hira Morillo  Thoracic Surgery  [address] [place] [address], [address] [place] [address]  Phone: [phone]  Fax: [phone]  Follow Up Time:

## 2023-12-07 NOTE — DISCHARGE NOTE PROVIDER - NSDCFUADDINST_GEN_ALL_CORE_FT
Please walk 4-5 x per day; increase as tolerated. You may climb stairs. Continue to use the incentive spirometer.   You may keep wounds uncovered. Please shower daily with soap and water. The suture will be removed in the office at the follow up appointment.   Please call the office at [phone] if you have fevers, chills, worsening shortness of breath, chest pain, warmth, redness or purulent discharge from the wound.  Please walk 4-5 x per day; increase as tolerated. You may climb stairs. Continue to use the incentive spirometer.   You may keep wounds uncovered. Please shower daily with soap and water. The suture will be removed in the office at the follow up appointment.   Please call the office at [phone] if you have fevers, chills, worsening shortness of breath, chest pain, warmth, redness or purulent discharge from the wound.

## 2023-12-07 NOTE — PROGRESS NOTE ADULT - SUBJECTIVE AND OBJECTIVE BOX
Anesthesia Pain Management Service    SUBJECTIVE: Patient is primarily Mandarin speaking, pacific  ID#  [id number], Mohsen used.  Patient is doing well with IV PCA and no significant problems reported.    Pain Scale Score	At rest: _5/10__ 	With Activity: ___ 	[X ] Refer to charted pain scores    THERAPY:    [ ] IV PCA Morphine		[ ] 5 mg/mL	[ ] 1 mg/mL  [X ] IV PCA Hydromorphone	[ ] 5 mg/mL	[X ] 1 mg/mL  [ ] IV PCA Fentanyl		[ ] 50 micrograms/mL    Demand dose __0.2_ lockout __6_ (minutes) Continuous Rate _0__ Total: 0.3___   mg used (in past 24 hrs)      MEDICATIONS  (STANDING):  acetaminophen     Tablet .. 650 milliGRAM(s) Oral every 6 hours  aspirin enteric coated 81 milliGRAM(s) Oral daily  atorvastatin 20 milliGRAM(s) Oral at bedtime  cyanocobalamin 1000 MICROGram(s) Oral daily  dextrose 5%. 1000 milliLiter(s) (50 mL/Hr) IV Continuous <Continuous>  dextrose 5%. 1000 milliLiter(s) (100 mL/Hr) IV Continuous <Continuous>  dextrose 50% Injectable 12.5 Gram(s) IV Push once  dextrose 50% Injectable 25 Gram(s) IV Push once  dextrose 50% Injectable 25 Gram(s) IV Push once  folic acid 1 milliGRAM(s) Oral daily  glucagon  Injectable 1 milliGRAM(s) IntraMuscular once  heparin   Injectable 5000 Unit(s) SubCutaneous every 8 hours  insulin lispro (ADMELOG) corrective regimen sliding scale   SubCutaneous at bedtime  insulin lispro (ADMELOG) corrective regimen sliding scale   SubCutaneous three times a day before meals  multivitamin 1 Tablet(s) Oral daily  polyethylene glycol 3350 17 Gram(s) Oral daily  senna 2 Tablet(s) Oral at bedtime    MEDICATIONS  (PRN):  dextrose Oral Gel 15 Gram(s) Oral once PRN Blood Glucose LESS THAN 70 milliGRAM(s)/deciliter  HYDROmorphone  Injectable 0.2 milliGRAM(s) IV Push every 3 hours PRN Severe breakthrough  Pain (7 - 10)  naloxone Injectable 0.1 milliGRAM(s) IV Push every 3 minutes PRN For ANY of the following changes in patient status:  A. RR LESS THAN 10 breaths per minute, B. Oxygen saturation LESS THAN 90%, C. Sedation score of 6  ondansetron Injectable 4 milliGRAM(s) IV Push every 6 hours PRN Nausea  oxyCODONE    IR 5 milliGRAM(s) Oral every 3 hours PRN Severe Pain (7 - 10)  oxyCODONE    IR 2.5 milliGRAM(s) Oral every 3 hours PRN Moderate Pain (4 - 6)      OBJECTIVE:  Patient is sitting up in chair.    Sedation Score:	[ X] Alert	[ ] Drowsy 	[ ] Arousable	[ ] Asleep	[ ] Unresponsive    Side Effects:	[X ] None	[ ] Nausea	[ ] Vomiting	[ ] Pruritus  		[ ] Other:    Vital Signs Last 24 Hrs  T(C): 39.2 (07 Dec 2023 12:27), Max: 39.2 (07 Dec 2023 12:27)  T(F): 102.6 (07 Dec 2023 12:27), Max: 102.6 (07 Dec 2023 12:27)  HR: 89 (07 Dec 2023 12:27) (60 - 89)  BP: 110/62 (07 Dec 2023 12:27) (103/67 - 143/93)  BP(mean): 88 (06 Dec 2023 20:00) (86 - 108)  RR: 18 (07 Dec 2023 12:27) (12 - 19)  SpO2: 96% (07 Dec 2023 12:27) (95% - 100%)    Parameters below as of 07 Dec 2023 12:27  Patient On (Oxygen Delivery Method): room air        ASSESSMENT/ PLAN    Therapy to  be:	[ ] Continue   [ X] Discontinued   [X ] Change to prn Analgesics    Documentation and Verification of current medications:   [X] Done	[ ] Not done, not elligible    Comments: IV Dilaudid PCA discontinued. PRN Oral/IV opioids and/or Adjuvant non-opioid medication to be ordered at this point.    Progress Note written now but Patient was seen earlier. Anesthesia Pain Management Service    SUBJECTIVE: Patient is primarily Mandarin speaking, pacific  ID#  [id number], Mohsen used.  Patient is doing well with IV PCA and no significant problems reported.    Pain Scale Score	At rest: _5/10__ 	With Activity: ___ 	[X ] Refer to charted pain scores    THERAPY:    [ ] IV PCA Morphine		[ ] 5 mg/mL	[ ] 1 mg/mL  [X ] IV PCA Hydromorphone	[ ] 5 mg/mL	[X ] 1 mg/mL  [ ] IV PCA Fentanyl		[ ] 50 micrograms/mL    Demand dose __0.2_ lockout __6_ (minutes) Continuous Rate _0__ Total: 0.3___   mg used (in past 24 hrs)      MEDICATIONS  (STANDING):  acetaminophen     Tablet .. 650 milliGRAM(s) Oral every 6 hours  aspirin enteric coated 81 milliGRAM(s) Oral daily  atorvastatin 20 milliGRAM(s) Oral at bedtime  cyanocobalamin 1000 MICROGram(s) Oral daily  dextrose 5%. 1000 milliLiter(s) (50 mL/Hr) IV Continuous <Continuous>  dextrose 5%. 1000 milliLiter(s) (100 mL/Hr) IV Continuous <Continuous>  dextrose 50% Injectable 12.5 Gram(s) IV Push once  dextrose 50% Injectable 25 Gram(s) IV Push once  dextrose 50% Injectable 25 Gram(s) IV Push once  folic acid 1 milliGRAM(s) Oral daily  glucagon  Injectable 1 milliGRAM(s) IntraMuscular once  heparin   Injectable 5000 Unit(s) SubCutaneous every 8 hours  insulin lispro (ADMELOG) corrective regimen sliding scale   SubCutaneous at bedtime  insulin lispro (ADMELOG) corrective regimen sliding scale   SubCutaneous three times a day before meals  multivitamin 1 Tablet(s) Oral daily  polyethylene glycol 3350 17 Gram(s) Oral daily  senna 2 Tablet(s) Oral at bedtime    MEDICATIONS  (PRN):  dextrose Oral Gel 15 Gram(s) Oral once PRN Blood Glucose LESS THAN 70 milliGRAM(s)/deciliter  HYDROmorphone  Injectable 0.2 milliGRAM(s) IV Push every 3 hours PRN Severe breakthrough  Pain (7 - 10)  naloxone Injectable 0.1 milliGRAM(s) IV Push every 3 minutes PRN For ANY of the following changes in patient status:  A. RR LESS THAN 10 breaths per minute, B. Oxygen saturation LESS THAN 90%, C. Sedation score of 6  ondansetron Injectable 4 milliGRAM(s) IV Push every 6 hours PRN Nausea  oxyCODONE    IR 5 milliGRAM(s) Oral every 3 hours PRN Severe Pain (7 - 10)  oxyCODONE    IR 2.5 milliGRAM(s) Oral every 3 hours PRN Moderate Pain (4 - 6)      OBJECTIVE:  Patient is sitting up in chair.    Sedation Score:	[ X] Alert	[ ] Drowsy 	[ ] Arousable	[ ] Asleep	[ ] Unresponsive    Side Effects:	[X ] None	[ ] Nausea	[ ] Vomiting	[ ] Pruritus  		[ ] Other:    Vital Signs Last 24 Hrs  T(C): 39.2 (07 Dec 2023 12:27), Max: 39.2 (07 Dec 2023 12:27)  T(F): 102.6 (07 Dec 2023 12:27), Max: 102.6 (07 Dec 2023 12:27)  HR: 89 (07 Dec 2023 12:27) (60 - 89)  BP: 110/62 (07 Dec 2023 12:27) (103/67 - 143/93)  BP(mean): 88 (06 Dec 2023 20:00) (86 - 108)  RR: 18 (07 Dec 2023 12:27) (12 - 19)  SpO2: 96% (07 Dec 2023 12:27) (95% - 100%)    Parameters below as of 07 Dec 2023 12:27  Patient On (Oxygen Delivery Method): room air        ASSESSMENT/ PLAN    Therapy to  be:	[ ] Continue   [ X] Discontinued   [X ] Change to prn Analgesics    Documentation and Verification of current medications:   [X] Done	[ ] Not done, not elligible    Comments: IV Dilaudid PCA discontinued. PRN Oral/IV opioids and/or Adjuvant non-opioid medication to be ordered at this point.    Progress Note written now but Patient was seen earlier.

## 2023-12-07 NOTE — DISCHARGE NOTE PROVIDER - HOSPITAL COURSE
64 y/o male PMH HLD non obstructive CAD on recent cardiac cath presents with diagnosis of solitary pulmonary nodule. Pt with right upper pulmonary nodule. Pt s/p Flex bronch, right Vats, robotic assisted RUL wedge resection on 12/6/23.  Post op course without complication, patient stable for discharge home when chest tube removed. 64 y/o male PMH HLD non obstructive CAD on recent cardiac cath presents with diagnosis of solitary pulmonary nodule. Pt with right upper pulmonary nodule. Pt s/p Flex bronch, right Vats, robotic assisted RUL wedge resection on 12/6/23. Today, patient was seen and examined at bedside. He is lying on bedside, on room air, not in acute distress. Mandarin  used at bedside. States he had mild pain that has been well controlled with medications. Right chest tube was pulled out with no complications. CXR was performed without showing significant findings. Denies SOB, chest pain, abdominal pain, nausea, vomiting. PT is in good condition to be discharged home today.    Vital Signs Last 24 Hrs  T(C): 36.4 (07 Dec 2023 08:06), Max: 37.1 (07 Dec 2023 00:10)  T(F): 97.5 (07 Dec 2023 08:06), Max: 98.7 (07 Dec 2023 00:10)  HR: 66 (07 Dec 2023 08:06) (60 - 82)  BP: 103/67 (07 Dec 2023 08:06) (103/67 - 143/93)  BP(mean): 88 (06 Dec 2023 20:00) (86 - 108)  RR: 16 (07 Dec 2023 08:06) (12 - 19)  SpO2: 95% (07 Dec 2023 08:06) (95% - 100%)    Parameters below as of 07 Dec 2023 08:06  Patient On (Oxygen Delivery Method): room air    PHYSICAL EXAM:  Gen: NAD  Resp: Respirations unlabored. Bilateral chest rise.   Card: RRR.   GI: Soft. Nontender. Nondistended.   Skin: Warm, well perfused, no masses or organomegaly  EXT: No clubbing, cyanosis, or edema  Tubes: Right CT pulled out  Incisions: sites c/d/i     62 y/o male PMH HLD non obstructive CAD on recent cardiac cath presents with diagnosis of solitary pulmonary nodule. Pt with right upper pulmonary nodule. Pt s/p Flex bronch, right Vats, robotic assisted RUL wedge resection on 12/6/23. Today, patient was seen and examined at bedside. He is lying on bedside, on room air, not in acute distress. Mandarin  used at bedside. States he had mild pain that has been well controlled with medications. Right chest tube was pulled out with no complications. CXR was performed without showing significant findings. Denies SOB, chest pain, abdominal pain, nausea, vomiting. PT is in good condition to be discharged home today.    Vital Signs Last 24 Hrs  T(C): 36.4 (07 Dec 2023 08:06), Max: 37.1 (07 Dec 2023 00:10)  T(F): 97.5 (07 Dec 2023 08:06), Max: 98.7 (07 Dec 2023 00:10)  HR: 66 (07 Dec 2023 08:06) (60 - 82)  BP: 103/67 (07 Dec 2023 08:06) (103/67 - 143/93)  BP(mean): 88 (06 Dec 2023 20:00) (86 - 108)  RR: 16 (07 Dec 2023 08:06) (12 - 19)  SpO2: 95% (07 Dec 2023 08:06) (95% - 100%)    Parameters below as of 07 Dec 2023 08:06  Patient On (Oxygen Delivery Method): room air    PHYSICAL EXAM:  Gen: NAD  Resp: Respirations unlabored. Bilateral chest rise.   Card: RRR.   GI: Soft. Nontender. Nondistended.   Skin: Warm, well perfused, no masses or organomegaly  EXT: No clubbing, cyanosis, or edema  Tubes: Right CT pulled out  Incisions: sites c/d/i     62 y/o male PMH HLD non obstructive CAD on recent cardiac cath presents with diagnosis of solitary pulmonary nodule. Pt with right upper pulmonary nodule. Pt s/p Flex bronch, right Vats, robotic assisted RUL wedge resection on 12/6/23. Today, patient was seen and examined at bedside. He is lying on bedside, on room air, not in acute distress. Mandarin  used at bedside. States he had mild pain that has been well controlled with medications. Right chest tube was pulled out with no complications. CXR was performed without showing significant findings. Denies SOB, chest pain, abdominal pain, nausea, vomiting. PT is in good condition to be discharged home today. Pt was seen and examined by Dr. Morillo. Plan above d/w Dr. Morillo.    Vital Signs Last 24 Hrs  T(C): 36.4 (07 Dec 2023 08:06), Max: 37.1 (07 Dec 2023 00:10)  T(F): 97.5 (07 Dec 2023 08:06), Max: 98.7 (07 Dec 2023 00:10)  HR: 66 (07 Dec 2023 08:06) (60 - 82)  BP: 103/67 (07 Dec 2023 08:06) (103/67 - 143/93)  BP(mean): 88 (06 Dec 2023 20:00) (86 - 108)  RR: 16 (07 Dec 2023 08:06) (12 - 19)  SpO2: 95% (07 Dec 2023 08:06) (95% - 100%)    Parameters below as of 07 Dec 2023 08:06  Patient On (Oxygen Delivery Method): room air    PHYSICAL EXAM:  Gen: NAD  Resp: Respirations unlabored. Bilateral chest rise.   Card: RRR.   GI: Soft. Nontender. Nondistended.   Skin: Warm, well perfused, no masses or organomegaly  EXT: No clubbing, cyanosis, or edema  Tubes: Right CT pulled out  Incisions: sites c/d/i     62 y/o male PMH HLD non obstructive CAD on recent cardiac cath presents with diagnosis of solitary pulmonary nodule. Pt with right upper pulmonary nodule. Pt s/p Flex bronch, right Vats, robotic assisted RUL wedge resection on 12/6/23. Today, patient was seen and examined at bedside. He is lying on bedside, on room air, not in acute distress. Mandarin  used at bedside. States he had mild pain that has been well controlled with medications. Right chest tube was pulled out with no complications. CXR was performed without showing significant findings. Denies SOB, chest pain, abdominal pain, nausea, vomiting. PT is in good condition to be discharged home today. Pt was seen and examined by Dr. Morillo. Plan above d/w Dr. Morillo.      Vital Signs Last 24 Hrs  T(C): 37.6 (08 Dec 2023 04:54), Max: 39.2 (07 Dec 2023 12:27)  T(F): 99.6 (08 Dec 2023 04:54), Max: 102.6 (07 Dec 2023 12:27)  HR: 92 (08 Dec 2023 04:54) (76 - 96)  BP: 107/66 (08 Dec 2023 04:54) (100/62 - 110/62)  BP(mean): --  RR: 18 (08 Dec 2023 04:54) (18 - 18)  SpO2: 95% (08 Dec 2023 04:54) (95% - 97%)    Parameters below as of 08 Dec 2023 04:54  Patient On (Oxygen Delivery Method): room air    PHYSICAL EXAM:  Gen: NAD  Resp: Respirations unlabored. Bilateral chest rise.   Card: RRR.   GI: Soft. Nontender. Nondistended.   Skin: Warm, well perfused, no masses or organomegaly  EXT: No clubbing, cyanosis, or edema  Tubes: Right CT pulled out  Incisions: sites c/d/i     64 y/o male PMH HLD non obstructive CAD on recent cardiac cath presents with diagnosis of solitary pulmonary nodule. Pt with right upper pulmonary nodule. Pt s/p Flex bronch, right Vats, robotic assisted RUL wedge resection on 12/6/23. 12/7/23 -  Right chest tube was pulled out with no complications. CXR was performed without showing significant findings. Patient developed fever (102 F) and stayed for another day, with no other complaints. Today, patient was seen and examined at bedside, denies SOB, chest pain, abdominal pain, nausea, vomiting, fever. PT is in good condition to be discharged home today. Plan above d/w Dr. Morillo.      Vital Signs Last 24 Hrs  T(C): 36.6 (08 Dec 2023 08:10), Max: 39.2 (07 Dec 2023 12:27)  T(F): 97.9 (08 Dec 2023 08:10), Max: 102.6 (07 Dec 2023 12:27)  HR: 83 (08 Dec 2023 08:10) (60 - 96)  BP: 130/75 (08 Dec 2023 08:10) (100/62 - 132/70)  BP(mean): --  RR: 18 (08 Dec 2023 08:10) (18 - 18)  SpO2: 100% (08 Dec 2023 08:10) (95% - 100%)    Parameters below as of 08 Dec 2023 08:10  Patient On (Oxygen Delivery Method): room air      PHYSICAL EXAM:  Gen: NAD  Resp: Respirations unlabored. Bilateral chest rise.   Card: RRR.   GI: Soft. Nontender. Nondistended.   Skin: Warm, well perfused, no masses or organomegaly  EXT: No clubbing, cyanosis, or edema  Tubes: Right CT pulled out  Incisions: sites c/d/i     62 y/o male PMH HLD non obstructive CAD on recent cardiac cath presents with diagnosis of solitary pulmonary nodule. Pt with right upper pulmonary nodule. Pt s/p Flex bronch, right Vats, robotic assisted RUL wedge resection on 12/6/23. 12/7/23 -  Right chest tube was pulled out with no complications. CXR was performed without showing significant findings. Patient developed fever (102 F) and stayed for another day, with no other complaints. Today, patient was seen and examined at bedside, denies SOB, chest pain, abdominal pain, nausea, vomiting, fever. PT is in good condition to be discharged home today. Plan above d/w Dr. Morillo.      Vital Signs Last 24 Hrs  T(C): 36.6 (08 Dec 2023 08:10), Max: 39.2 (07 Dec 2023 12:27)  T(F): 97.9 (08 Dec 2023 08:10), Max: 102.6 (07 Dec 2023 12:27)  HR: 83 (08 Dec 2023 08:10) (60 - 96)  BP: 130/75 (08 Dec 2023 08:10) (100/62 - 132/70)  BP(mean): --  RR: 18 (08 Dec 2023 08:10) (18 - 18)  SpO2: 100% (08 Dec 2023 08:10) (95% - 100%)    Parameters below as of 08 Dec 2023 08:10  Patient On (Oxygen Delivery Method): room air      PHYSICAL EXAM:  Gen: NAD  Resp: Respirations unlabored. Bilateral chest rise.   Card: RRR.   GI: Soft. Nontender. Nondistended.   Skin: Warm, well perfused, no masses or organomegaly  EXT: No clubbing, cyanosis, or edema  Tubes: Right CT pulled out  Incisions: sites c/d/i

## 2023-12-07 NOTE — DISCHARGE NOTE PROVIDER - NSDCFUADDAPPT_GEN_ALL_CORE_FT
Follow up with Dr. Morillo in 1-2 weeks [phone]   Chest x-ray 1-2 days prior to appointment with Dr. Morilol    Follow up with primary care provider in one week  Follow up with Dr. Morillo in 1-2 weeks [phone]   Chest x-ray 1-2 days prior to appointment with Dr. Morillo    Follow up with primary care provider in one week

## 2023-12-08 ENCOUNTER — TRANSCRIPTION ENCOUNTER (OUTPATIENT)
Age: 63
End: 2023-12-08

## 2023-12-08 VITALS
OXYGEN SATURATION: 97 % | TEMPERATURE: 99 F | RESPIRATION RATE: 18 BRPM | DIASTOLIC BLOOD PRESSURE: 60 MMHG | HEART RATE: 74 BPM | SYSTOLIC BLOOD PRESSURE: 101 MMHG

## 2023-12-08 LAB
ANION GAP SERPL CALC-SCNC: 10 MMOL/L — SIGNIFICANT CHANGE UP (ref 7–14)
ANION GAP SERPL CALC-SCNC: 10 MMOL/L — SIGNIFICANT CHANGE UP (ref 7–14)
BUN SERPL-MCNC: 17 MG/DL — SIGNIFICANT CHANGE UP (ref 7–23)
BUN SERPL-MCNC: 17 MG/DL — SIGNIFICANT CHANGE UP (ref 7–23)
CALCIUM SERPL-MCNC: 8.8 MG/DL — SIGNIFICANT CHANGE UP (ref 8.4–10.5)
CALCIUM SERPL-MCNC: 8.8 MG/DL — SIGNIFICANT CHANGE UP (ref 8.4–10.5)
CHLORIDE SERPL-SCNC: 101 MMOL/L — SIGNIFICANT CHANGE UP (ref 98–107)
CHLORIDE SERPL-SCNC: 101 MMOL/L — SIGNIFICANT CHANGE UP (ref 98–107)
CO2 SERPL-SCNC: 27 MMOL/L — SIGNIFICANT CHANGE UP (ref 22–31)
CO2 SERPL-SCNC: 27 MMOL/L — SIGNIFICANT CHANGE UP (ref 22–31)
CREAT SERPL-MCNC: 0.77 MG/DL — SIGNIFICANT CHANGE UP (ref 0.5–1.3)
CREAT SERPL-MCNC: 0.77 MG/DL — SIGNIFICANT CHANGE UP (ref 0.5–1.3)
EGFR: 100 ML/MIN/1.73M2 — SIGNIFICANT CHANGE UP
EGFR: 100 ML/MIN/1.73M2 — SIGNIFICANT CHANGE UP
GLUCOSE BLDC GLUCOMTR-MCNC: 126 MG/DL — HIGH (ref 70–99)
GLUCOSE BLDC GLUCOMTR-MCNC: 126 MG/DL — HIGH (ref 70–99)
GLUCOSE BLDC GLUCOMTR-MCNC: 224 MG/DL — HIGH (ref 70–99)
GLUCOSE BLDC GLUCOMTR-MCNC: 224 MG/DL — HIGH (ref 70–99)
GLUCOSE SERPL-MCNC: 125 MG/DL — HIGH (ref 70–99)
GLUCOSE SERPL-MCNC: 125 MG/DL — HIGH (ref 70–99)
HCT VFR BLD CALC: 37.3 % — LOW (ref 39–50)
HCT VFR BLD CALC: 37.3 % — LOW (ref 39–50)
HGB BLD-MCNC: 12.7 G/DL — LOW (ref 13–17)
HGB BLD-MCNC: 12.7 G/DL — LOW (ref 13–17)
MCHC RBC-ENTMCNC: 32.5 PG — SIGNIFICANT CHANGE UP (ref 27–34)
MCHC RBC-ENTMCNC: 32.5 PG — SIGNIFICANT CHANGE UP (ref 27–34)
MCHC RBC-ENTMCNC: 34 GM/DL — SIGNIFICANT CHANGE UP (ref 32–36)
MCHC RBC-ENTMCNC: 34 GM/DL — SIGNIFICANT CHANGE UP (ref 32–36)
MCV RBC AUTO: 95.4 FL — SIGNIFICANT CHANGE UP (ref 80–100)
MCV RBC AUTO: 95.4 FL — SIGNIFICANT CHANGE UP (ref 80–100)
NRBC # BLD: 0 /100 WBCS — SIGNIFICANT CHANGE UP (ref 0–0)
NRBC # BLD: 0 /100 WBCS — SIGNIFICANT CHANGE UP (ref 0–0)
NRBC # FLD: 0 K/UL — SIGNIFICANT CHANGE UP (ref 0–0)
NRBC # FLD: 0 K/UL — SIGNIFICANT CHANGE UP (ref 0–0)
PLATELET # BLD AUTO: 160 K/UL — SIGNIFICANT CHANGE UP (ref 150–400)
PLATELET # BLD AUTO: 160 K/UL — SIGNIFICANT CHANGE UP (ref 150–400)
POTASSIUM SERPL-MCNC: 3.8 MMOL/L — SIGNIFICANT CHANGE UP (ref 3.5–5.3)
POTASSIUM SERPL-MCNC: 3.8 MMOL/L — SIGNIFICANT CHANGE UP (ref 3.5–5.3)
POTASSIUM SERPL-SCNC: 3.8 MMOL/L — SIGNIFICANT CHANGE UP (ref 3.5–5.3)
POTASSIUM SERPL-SCNC: 3.8 MMOL/L — SIGNIFICANT CHANGE UP (ref 3.5–5.3)
RBC # BLD: 3.91 M/UL — LOW (ref 4.2–5.8)
RBC # BLD: 3.91 M/UL — LOW (ref 4.2–5.8)
RBC # FLD: 12.4 % — SIGNIFICANT CHANGE UP (ref 10.3–14.5)
RBC # FLD: 12.4 % — SIGNIFICANT CHANGE UP (ref 10.3–14.5)
SODIUM SERPL-SCNC: 138 MMOL/L — SIGNIFICANT CHANGE UP (ref 135–145)
SODIUM SERPL-SCNC: 138 MMOL/L — SIGNIFICANT CHANGE UP (ref 135–145)
WBC # BLD: 6.34 K/UL — SIGNIFICANT CHANGE UP (ref 3.8–10.5)
WBC # BLD: 6.34 K/UL — SIGNIFICANT CHANGE UP (ref 3.8–10.5)
WBC # FLD AUTO: 6.34 K/UL — SIGNIFICANT CHANGE UP (ref 3.8–10.5)
WBC # FLD AUTO: 6.34 K/UL — SIGNIFICANT CHANGE UP (ref 3.8–10.5)

## 2023-12-08 PROCEDURE — 71045 X-RAY EXAM CHEST 1 VIEW: CPT | Mod: 26

## 2023-12-08 RX ORDER — ACETAMINOPHEN 500 MG
2 TABLET ORAL
Qty: 0 | Refills: 0 | DISCHARGE
Start: 2023-12-08

## 2023-12-08 RX ORDER — OXYCODONE HYDROCHLORIDE 5 MG/1
1 TABLET ORAL
Qty: 30 | Refills: 0
Start: 2023-12-08 | End: 2023-12-12

## 2023-12-08 RX ADMIN — Medication 650 MILLIGRAM(S): at 05:19

## 2023-12-08 RX ADMIN — PREGABALIN 1000 MICROGRAM(S): 225 CAPSULE ORAL at 12:39

## 2023-12-08 RX ADMIN — Medication 2: at 12:36

## 2023-12-08 RX ADMIN — Medication 81 MILLIGRAM(S): at 12:38

## 2023-12-08 RX ADMIN — POLYETHYLENE GLYCOL 3350 17 GRAM(S): 17 POWDER, FOR SOLUTION ORAL at 12:38

## 2023-12-08 RX ADMIN — Medication 650 MILLIGRAM(S): at 12:38

## 2023-12-08 RX ADMIN — Medication 1 TABLET(S): at 12:39

## 2023-12-08 RX ADMIN — HEPARIN SODIUM 5000 UNIT(S): 5000 INJECTION INTRAVENOUS; SUBCUTANEOUS at 05:20

## 2023-12-08 RX ADMIN — Medication 1 MILLIGRAM(S): at 12:39

## 2023-12-08 NOTE — DISCHARGE NOTE NURSING/CASE MANAGEMENT/SOCIAL WORK - PATIENT PORTAL LINK FT
You can access the FollowMyHealth Patient Portal offered by [facility] by registering at the following website: [email or web address]. By joining [employer]’s FollowMyHealth portal, you will also be able to view your health information using other applications (apps) compatible with our system. You can access the FollowMyHealth Patient Portal offered by [facility] by registering at the following website: [email or web address]. By joining [employer]’s FollowMyHealth portal, you will also be able to view your health information using other applications (apps) compatible with our system.

## 2023-12-08 NOTE — DISCHARGE NOTE NURSING/CASE MANAGEMENT/SOCIAL WORK - NSDCFUADDAPPT_GEN_ALL_CORE_FT
Follow up with Dr. Morillo in 1-2 weeks [phone]   Chest x-ray 1-2 days prior to appointment with Dr. Morillo    Follow up with primary care provider in one week  Follow up with Dr. Morillo in 1-2 weeks [phone]   Chest x-ray 1-2 days prior to appointment with Dr. Morillo    Follow up with primary care provider in one week

## 2023-12-08 NOTE — DISCHARGE NOTE NURSING/CASE MANAGEMENT/SOCIAL WORK - NSDCPEFALRISK_GEN_ALL_CORE
For information on Fall & Injury Prevention, visit: [email or web address] OR  [email or web address] OR  https://www.cdc.gov/steadi/patient.html For information on Fall & Injury Prevention, visit: [email or web address] OR  [email or web address] OR  https://www.cdc.gov/steadi/patient.html

## 2023-12-11 PROBLEM — E04.9 NONTOXIC GOITER, UNSPECIFIED: Chronic | Status: ACTIVE | Noted: 2023-11-30

## 2023-12-11 PROBLEM — E11.9 TYPE 2 DIABETES MELLITUS WITHOUT COMPLICATIONS: Chronic | Status: ACTIVE | Noted: 2023-11-30

## 2023-12-11 PROBLEM — I25.10 ATHEROSCLEROTIC HEART DISEASE OF NATIVE CORONARY ARTERY WITHOUT ANGINA PECTORIS: Chronic | Status: ACTIVE | Noted: 2023-11-30

## 2023-12-11 PROBLEM — Z87.438 PERSONAL HISTORY OF OTHER DISEASES OF MALE GENITAL ORGANS: Chronic | Status: ACTIVE | Noted: 2023-11-30

## 2023-12-11 PROBLEM — E78.5 HYPERLIPIDEMIA, UNSPECIFIED: Chronic | Status: ACTIVE | Noted: 2023-11-30

## 2023-12-11 PROBLEM — M25.50 PAIN IN UNSPECIFIED JOINT: Chronic | Status: ACTIVE | Noted: 2023-11-30

## 2023-12-11 PROBLEM — F17.200 NICOTINE DEPENDENCE, UNSPECIFIED, UNCOMPLICATED: Chronic | Status: ACTIVE | Noted: 2023-11-30

## 2023-12-11 PROBLEM — R91.1 SOLITARY PULMONARY NODULE: Chronic | Status: ACTIVE | Noted: 2023-11-30

## 2023-12-11 LAB
CULTURE RESULTS: SIGNIFICANT CHANGE UP
SPECIMEN SOURCE: SIGNIFICANT CHANGE UP

## 2023-12-18 LAB
SURGICAL PATHOLOGY STUDY: SIGNIFICANT CHANGE UP
SURGICAL PATHOLOGY STUDY: SIGNIFICANT CHANGE UP

## 2023-12-19 PROBLEM — L02.91 ABSCESS: Status: ACTIVE | Noted: 2023-12-19

## 2023-12-21 ENCOUNTER — APPOINTMENT (OUTPATIENT)
Dept: THORACIC SURGERY | Facility: CLINIC | Age: 63
End: 2023-12-21
Payer: MEDICAID

## 2023-12-21 VITALS
DIASTOLIC BLOOD PRESSURE: 67 MMHG | OXYGEN SATURATION: 97 % | SYSTOLIC BLOOD PRESSURE: 102 MMHG | BODY MASS INDEX: 22.69 KG/M2 | RESPIRATION RATE: 16 BRPM | HEIGHT: 67.72 IN | WEIGHT: 148 LBS | TEMPERATURE: 98.6 F

## 2023-12-21 DIAGNOSIS — L02.91 CUTANEOUS ABSCESS, UNSPECIFIED: ICD-10-CM

## 2023-12-21 PROCEDURE — 99024 POSTOP FOLLOW-UP VISIT: CPT

## 2023-12-22 NOTE — ASSESSMENT
[FreeTextEntry1] : Mr. KACI DUVALL, 63 year old male, former smoker, w/ hx of DMII, HLD, who c/o SOB on exertion to Pulm, CT was ordered and found abnormal.  PFTs on 9/28/23: (Post) FVC: 81%; FEV1: 71%; DLCO: 67%  CT Chest on 10/20/23: (MSR) - New Cavitary lung lesion in the RUL; 3 cm. A thickened wall which measures up to 4 mm in thickness and inferiorly there is a nodular component measuring up to 9mm. - New 5 mm nodule in RML horizontal fissure (3, 113) - Stable, 4 mm nodule in MRL (3, 145) - Stomach very distended possibly due to gastric outlet obstruction.  PET/CT on 11/3/2023: (MSR) - RUL cystic lesion; 2.7 cm; SUV 3.8 (Image 53) - 4-5 mm RML nodules which are too small to characterize by PET/CT (Image 75)  Now s/p Rt VATS R.A., RUL wedge rxn of RUL nodule on 12/6/23. Path showed abscess. AFB, GMS and Gram stains are negative.  CXR today: reviewed.   Patient c/o dry cough, denies shortness of breath, chest pain, fever, chills.   I have reviewed the patient's medical records and diagnostic images at time of this office consultation and have made the following recommendation: 1. CXR and Path reviewed and explained to patient, no malignancy, but lung abscess. Recommended patient to f/u with Dr. Villegas.  No further thoracic surgery visits are required at this time, however, he may return to the office as needed if any issues arise. Recommendations reviewed with patient during this office visit, and all questions answered; Patient instructed on the importance of follow up and verbalizes understanding.  I, KEVEN Santiago, personally performed the evaluation and management (E/M) services for this established patient who follow up today with an existing condition.  That E/M includes conducting the examination, assessing all new/exacerbated/existing conditions, and establishing a plan of care.  Today, my ACP, Tameka Nam ANP-C, was here to observe my evaluation and management services for this existing condition to be followed going forward.

## 2023-12-22 NOTE — CONSULT LETTER
[Dear  ___] : Dear  [unfilled], [Consult Letter:] : I had the pleasure of evaluating your patient, [unfilled]. [Please see my note below.] : Please see my note below. [Consult Closing:] : Thank you very much for allowing me to participate in the care of this patient.  If you have any questions, please do not hesitate to contact me. [Sincerely,] : Sincerely, [FreeTextEntry2] : Dr. Bakari Platt (Ref) [FreeTextEntry3] : Hira Morillo MD, MPH  System Director of Thoracic Surgery  Director of Comprehensive Lung and Foregut [facility]  Professor Cardiovascular & Thoracic Surgery   [facility] [employer] at [facility]

## 2024-01-06 LAB
CULTURE RESULTS: SIGNIFICANT CHANGE UP
SPECIMEN SOURCE: SIGNIFICANT CHANGE UP

## 2024-01-24 LAB
CULTURE RESULTS: SIGNIFICANT CHANGE UP
SPECIMEN SOURCE: SIGNIFICANT CHANGE UP

## 2024-03-20 ENCOUNTER — NON-APPOINTMENT (OUTPATIENT)
Age: 64
End: 2024-03-20

## (undated) DEVICE — STAPLER COVIDIEN ENDO GIA STANDARD HANDLE

## (undated) DEVICE — DRSG CURITY GAUZE SPONGE 4 X 4" 12-PLY

## (undated) DEVICE — SYR LUER LOK 20CC

## (undated) DEVICE — SUT SILK 2-0 30" SH

## (undated) DEVICE — DRSG MASTISOL

## (undated) DEVICE — CHEST DRAIN PLEUR-EVAC DRY/WET ADULT-PEDS SINGLE (QUICK)

## (undated) DEVICE — CHEST DRAIN OASIS DRY SUCTION WATER SEAL

## (undated) DEVICE — XI DRAPE COLUMN

## (undated) DEVICE — STERIS DEFENDO 3-PIECE KIT (AIR/WATER, SUCTION & BIOPSY VALVES)

## (undated) DEVICE — XI OBTURATOR OPTICAL BLADELESS 8MM

## (undated) DEVICE — XI ARM CLIP APPLIER MEDIUM-LARGE

## (undated) DEVICE — ENDOCATCH GENERAL 15MM (PURPLE)

## (undated) DEVICE — MARKING PEN W RULER

## (undated) DEVICE — SUT VICRYL 4-0 27" PS-2 UNDYED

## (undated) DEVICE — TROCAR COVIDIEN VERSAONE BLADELESS FIXATION 12MM STANDARD

## (undated) DEVICE — SUT VICRYL 0 27" UR-6

## (undated) DEVICE — PACK ROBOTIC LIJ

## (undated) DEVICE — ELCTR BOVIE PENCIL SMOKE EVACUATION

## (undated) DEVICE — XI DRAPE ARM

## (undated) DEVICE — ELCTR BOVIE TIP BLADE INSULATED 6.5" EDGE

## (undated) DEVICE — D HELP - CLEARVIEW CLEARIFY SYSTEM

## (undated) DEVICE — SOL IRR POUR NS 0.9% 500ML

## (undated) DEVICE — GRASPER LAPA 5MMX35CM

## (undated) DEVICE — BLADE SURGICAL #10 STAINLESS

## (undated) DEVICE — TUBING OLYMPUS INSUFFLATION

## (undated) DEVICE — ENDOCATCH GENERAL 10MM (PURPLE)

## (undated) DEVICE — TUBING STRYKEFLOW II SUCTION / IRRIGATOR

## (undated) DEVICE — DRSG GAUZE PACKTNER ROLL

## (undated) DEVICE — XI SEAL UNIVERSIAL 5-12MM

## (undated) DEVICE — SUT PROLENE 0 30" CT-1

## (undated) DEVICE — NDL HYPO REGULAR BEVEL 22G X 1.5" (TURQUOISE)

## (undated) DEVICE — GOWN XL

## (undated) DEVICE — DRSG TEGADERM 4X4.75"

## (undated) DEVICE — TUBING SUCTION 20FT

## (undated) DEVICE — VENODYNE/SCD SLEEVE CALF MEDIUM

## (undated) DEVICE — ELCTR BOVIE TIP BLADE INSULATED 2.75" EDGE

## (undated) DEVICE — XI ARM FORCEP FENESTRATED BIPOLAR 8MM

## (undated) DEVICE — TROCAR COVIDIEN VERSASTEP PLUS 15MM STANDARD